# Patient Record
Sex: MALE | Race: BLACK OR AFRICAN AMERICAN | URBAN - METROPOLITAN AREA
[De-identification: names, ages, dates, MRNs, and addresses within clinical notes are randomized per-mention and may not be internally consistent; named-entity substitution may affect disease eponyms.]

---

## 2022-10-05 ENCOUNTER — OFFICE VISIT (OUTPATIENT)
Dept: FAMILY MEDICINE CLINIC | Age: 47
End: 2022-10-05
Payer: COMMERCIAL

## 2022-10-05 VITALS
HEIGHT: 74 IN | HEART RATE: 72 BPM | DIASTOLIC BLOOD PRESSURE: 84 MMHG | SYSTOLIC BLOOD PRESSURE: 110 MMHG | WEIGHT: 213.4 LBS | OXYGEN SATURATION: 98 % | BODY MASS INDEX: 27.39 KG/M2

## 2022-10-05 DIAGNOSIS — M25.461 PAIN AND SWELLING OF RIGHT KNEE: Primary | ICD-10-CM

## 2022-10-05 DIAGNOSIS — J02.9 SORE THROAT: ICD-10-CM
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
## 2022-10-05 DIAGNOSIS — M25.561 PAIN AND SWELLING OF RIGHT KNEE: Primary | ICD-10-CM

## 2022-10-05 DIAGNOSIS — R10.31 RIGHT LOWER QUADRANT PAIN: ICD-10-CM

## 2022-10-05 DIAGNOSIS — Z23 FLU VACCINE NEED: ICD-10-CM

## 2022-10-05 PROCEDURE — 99205 OFFICE O/P NEW HI 60 MIN: CPT | Performed by: FAMILY MEDICINE

## 2022-10-05 PROCEDURE — G8427 DOCREV CUR MEDS BY ELIG CLIN: HCPCS | Performed by: FAMILY MEDICINE

## 2022-10-05 PROCEDURE — 90471 IMMUNIZATION ADMIN: CPT | Performed by: FAMILY MEDICINE

## 2022-10-05 PROCEDURE — G8419 CALC BMI OUT NRM PARAM NOF/U: HCPCS | Performed by: FAMILY MEDICINE

## 2022-10-05 PROCEDURE — G8482 FLU IMMUNIZE ORDER/ADMIN: HCPCS | Performed by: FAMILY MEDICINE

## 2022-10-05 PROCEDURE — 4004F PT TOBACCO SCREEN RCVD TLK: CPT | Performed by: FAMILY MEDICINE

## 2022-10-05 PROCEDURE — 90674 CCIIV4 VAC NO PRSV 0.5 ML IM: CPT | Performed by: FAMILY MEDICINE

## 2022-10-05 RX ORDER — NAPROXEN 500 MG/1
500 TABLET ORAL 2 TIMES DAILY WITH MEALS
Qty: 60 TABLET | Refills: 0 | Status: SHIPPED | OUTPATIENT
Start: 2022-10-05 | End: 2022-10-26 | Stop reason: SDUPTHER

## 2022-10-05 RX ORDER — FAMOTIDINE 40 MG/1
40 TABLET, FILM COATED ORAL 2 TIMES DAILY
Qty: 60 TABLET | Refills: 0 | Status: SHIPPED | OUTPATIENT
Start: 2022-10-05 | End: 2022-10-26 | Stop reason: SDUPTHER

## 2022-10-05 ASSESSMENT — PATIENT HEALTH QUESTIONNAIRE - PHQ9
SUM OF ALL RESPONSES TO PHQ QUESTIONS 1-9: 2
2. FEELING DOWN, DEPRESSED OR HOPELESS: 2
SUM OF ALL RESPONSES TO PHQ QUESTIONS 1-9: 2
SUM OF ALL RESPONSES TO PHQ QUESTIONS 1-9: 2
SUM OF ALL RESPONSES TO PHQ9 QUESTIONS 1 & 2: 2
SUM OF ALL RESPONSES TO PHQ QUESTIONS 1-9: 2
1. LITTLE INTEREST OR PLEASURE IN DOING THINGS: 0

## 2022-10-05 NOTE — PROGRESS NOTES
Patient ID: Flynn Lombardi 1975    . Chief Complaint   Patient presents with    Pharyngitis     Intermittent sore throat  been going on for awhile covid test  not taken     Knee Pain     Right knee with cold weather causes pain states he did hit the knee before  pain going on for awhile  hit the knee last year          Pharyngitis    Knee Pain       Right knee pain: Ongoing for 2 years. Had 2 injuries to the right knee. The first injury was 2 years ago when he was carrying a very heavy load. He fell on his knee and sustained an injury. The next injury was 1 year ago when he was running up the steps. He was taking more than 1 step at a time. His knee hit the step with a significant force. He hit the knee on concrete and it became swollen. The pain was immediate. The next day it was better but since then he has had a swollen knee on and off. He has been trying ibuprofen and Tylenol for the knee pain. He saw a doctor in [facility] for the knee pain. When the knee is cold it is worse. He has tried wrapping the knee and putting Vicks ointment on his knee which helped. Sore throat: Ongoing for over a year. He has tried gargling with salt water and baking soda. Denies anything that makes it better or worse. Sleeping and foods do not make the sore throat pain worse. He is from [facility] and he admits to eating spicy foods. His cousin who is also here for his appointment says that he eats spicy foods and he has heartburn. Denies consuming a lot of pop or alcohol. History generated with assistance of . Orlin Castro. [id number]    Right abdominal pain: Ongoing since childhood. He attributes it to lots of gas buildup. However he also has had a hernia. Over the years they have reduced it multiple times. It goes into the groin and into the scrotum. There is no problem list on file for this patient. No past surgical history on file.     Family History   Problem Relation Age of Onset Osteoarthritis Mother        No current outpatient medications on file prior to visit. No current facility-administered medications on file prior to visit. Objective:         Physical Exam  Constitutional:       General: He is not in acute distress. Appearance: He is well-developed. HENT:      Head: Normocephalic and atraumatic. Right Ear: Hearing, tympanic membrane and external ear normal.      Left Ear: Hearing, tympanic membrane and external ear normal.      Nose: No mucosal edema or rhinorrhea. Mouth/Throat:      Pharynx: Posterior oropharyngeal erythema present. No oropharyngeal exudate. Tonsils: No tonsillar abscesses. Eyes:      General: Lids are normal.      Conjunctiva/sclera: Conjunctivae normal.   Neck:      Thyroid: No thyromegaly. Trachea: No tracheal deviation. Cardiovascular:      Rate and Rhythm: Normal rate and regular rhythm. Heart sounds: Normal heart sounds, S1 normal and S2 normal. No murmur heard. No gallop. Pulmonary:      Effort: Pulmonary effort is normal. No respiratory distress. Breath sounds: Normal breath sounds. No wheezing or rales. Abdominal:      Palpations: Abdomen is soft. There is no mass. Tenderness: There is abdominal tenderness in the right lower quadrant. Musculoskeletal:      Right knee: Swelling (diffusely swollen) present. No erythema, ecchymosis or lacerations. Decreased range of motion. Tenderness present. Right lower leg: No edema. Left lower leg: No edema. Skin:     General: Skin is warm and dry. Neurological:      Mental Status: He is oriented to person, place, and time. Psychiatric:         Speech: Speech normal.         Behavior: Behavior normal.         Thought Content:  Thought content normal.     Vitals:    10/05/22 1011   BP: 110/84   Site: Left Upper Arm   Position: Sitting   Cuff Size: Medium Adult   Pulse: 72   SpO2: 98%   Weight: 213 lb 6.4 oz (96.8 kg)   Height: 6' 1.5\" (1.867 m)     Body mass index is 27.77 kg/m². Wt Readings from Last 3 Encounters:   10/05/22 213 lb 6.4 oz (96.8 kg)     BP Readings from Last 3 Encounters:   10/05/22 110/84          No results found for this visit on 10/05/22. The ASCVD Risk score (Zbigniew SANTOS, et al., 2019) failed to calculate for the following reasons:    Cannot find a previous HDL lab    Cannot find a previous total cholesterol lab    The smoking status is invalid  Lab Review not applicable        Assessment:       Diagnosis Orders   1. Pain and swelling of right knee  MRI KNEE RIGHT [address] [place] [address], [facility], Orthopedic Surgery, [place]    naproxen (NAPROSYN) 500 MG tablet      2. Fall on stairs, subsequent encounter  MRI KNEE RIGHT [address] [place] [address], [facility], Orthopedic Surgery, [place]      3. Sore throat  famotidine (PEPCID) 40 MG tablet      4. Right lower quadrant pain  Chris Cortez MD, General Surgery, [facility]      5. Flu vaccine need  Influenza, FLUCELVAX, (age 10 mo+), IM, PF, 0.5 mL              Plan:      Recommended he apply ice to his knee but he says that makes the knee pain worse. Therefore will hold off on any further recommendations for pain. Will have him see orthopedic surgeon. And get MRI. Naproxen but warned him that the naproxen could make his sore throat symptoms worse. He wants the naproxen. The sore throat could be related to acid reflux/GERD. Patient is patient admits to consuming spicy foods. Of asked him to cut back on the spicy foods. We will start Pepcid and recheck in 3 weeks    Time: 1 hour    Extra time taken due to use of . Return in about 3 weeks (around 10/26/2022) for sore throat need translater 30minutes.

## 2022-10-26 ENCOUNTER — OFFICE VISIT (OUTPATIENT)
Dept: FAMILY MEDICINE CLINIC | Age: 47
End: 2022-10-26
Payer: COMMERCIAL

## 2022-10-26 VITALS
HEIGHT: 74 IN | BODY MASS INDEX: 27.72 KG/M2 | WEIGHT: 216 LBS | TEMPERATURE: 98 F | DIASTOLIC BLOOD PRESSURE: 70 MMHG | HEART RATE: 79 BPM | SYSTOLIC BLOOD PRESSURE: 110 MMHG

## 2022-10-26 DIAGNOSIS — M25.461 PAIN AND SWELLING OF RIGHT KNEE: ICD-10-CM

## 2022-10-26 DIAGNOSIS — M25.561 PAIN AND SWELLING OF RIGHT KNEE: ICD-10-CM

## 2022-10-26 DIAGNOSIS — Z78.9 NON-ENGLISH SPEAKING PATIENT: ICD-10-CM

## 2022-10-26 DIAGNOSIS — Z65.9 POOR SOCIAL SITUATION: ICD-10-CM

## 2022-10-26 DIAGNOSIS — J02.9 SORE THROAT: Primary | ICD-10-CM

## 2022-10-26 PROCEDURE — G8419 CALC BMI OUT NRM PARAM NOF/U: HCPCS | Performed by: FAMILY MEDICINE

## 2022-10-26 PROCEDURE — 4004F PT TOBACCO SCREEN RCVD TLK: CPT | Performed by: FAMILY MEDICINE

## 2022-10-26 PROCEDURE — G8427 DOCREV CUR MEDS BY ELIG CLIN: HCPCS | Performed by: FAMILY MEDICINE

## 2022-10-26 PROCEDURE — G8482 FLU IMMUNIZE ORDER/ADMIN: HCPCS | Performed by: FAMILY MEDICINE

## 2022-10-26 PROCEDURE — 99213 OFFICE O/P EST LOW 20 MIN: CPT | Performed by: FAMILY MEDICINE

## 2022-10-26 RX ORDER — FAMOTIDINE 40 MG/1
40 TABLET, FILM COATED ORAL 2 TIMES DAILY
Qty: 60 TABLET | Refills: 0 | Status: SHIPPED | OUTPATIENT
Start: 2022-10-26

## 2022-10-26 RX ORDER — NAPROXEN 500 MG/1
500 TABLET ORAL 2 TIMES DAILY WITH MEALS
Qty: 60 TABLET | Refills: 0 | Status: SHIPPED | OUTPATIENT
Start: 2022-10-26

## 2022-10-26 NOTE — PROGRESS NOTES
Patient ID: Albert Ascencio 1975    . Chief Complaint   Patient presents with    Pharyngitis     Lupie Flushing [id number]     HPI    Sore throat/GERD follow-up: He went to the pharmacy but his prescription was not there. Right knee pain: Had to reschedule his orthopedic surgeon appointment. He has an upcoming appointment on November 1. He needs for the MRI to be rescheduled. There is no problem list on file for this patient. No past surgical history on file. Family History   Problem Relation Age of Onset    Osteoarthritis Mother        No current outpatient medications on file prior to visit. No current facility-administered medications on file prior to visit. Objective:         Physical Exam  Constitutional:       Appearance: He is well-developed. Psychiatric:         Mood and Affect: Mood normal.         Behavior: Behavior normal.     Vitals:    10/26/22 1041   BP: 110/70   Site: Left Upper Arm   Position: Sitting   Cuff Size: Large Adult   Pulse: 79   Temp: 98 °F (36.7 °C)   Weight: 216 lb (98 kg)   Height: 6' 1.5\" (1.867 m)     Body mass index is 28.11 kg/m². Wt Readings from Last 3 Encounters:   10/26/22 216 lb (98 kg)   10/05/22 213 lb 6.4 oz (96.8 kg)     BP Readings from Last 3 Encounters:   10/26/22 110/70   10/05/22 110/84          No results found for this visit on 10/26/22. The ASCVD Risk score (Zbigniew SANTOS, et al., 2019) failed to calculate for the following reasons:    Cannot find a previous HDL lab    Cannot find a previous total cholesterol lab    The smoking status is invalid  Lab Review   No results found for any previous visit. Assessment:       Diagnosis Orders   1. Sore throat  famotidine (PEPCID) 40 MG tablet      2. Pain and swelling of right knee  naproxen (NAPROSYN) 500 MG tablet      3. Non-English speaking patient        4. Poor social situation                Plan:      Had staff check for the pharmacy.   Apparently patient's insurance is not accepted at the [facility] he wanted us to use. Prescription switched to [place]. Visit and documentation time 15 minutes due to non-English-speaking patient.  used. We will see him back in 1 month after he has been using his Pepcid. Return in about 1 month (around 11/26/2022) for [place].

## 2022-10-28 ENCOUNTER — HOSPITAL ENCOUNTER (OUTPATIENT)
Dept: GENERAL RADIOLOGY | Age: 47
Discharge: HOME | End: 2022-10-28
Payer: COMMERCIAL

## 2022-10-28 ENCOUNTER — HOSPITAL ENCOUNTER (OUTPATIENT)
Age: 47
Discharge: HOME | End: 2022-10-28
Payer: COMMERCIAL

## 2022-10-28 DIAGNOSIS — M25.561 RIGHT KNEE PAIN, UNSPECIFIED CHRONICITY: ICD-10-CM

## 2022-10-28 PROCEDURE — 73562 X-RAY EXAM OF KNEE 3: CPT

## 2022-11-01 ENCOUNTER — OFFICE VISIT (OUTPATIENT)
Dept: ORTHOPEDIC SURGERY | Age: 47
End: 2022-11-01
Payer: COMMERCIAL

## 2022-11-01 VITALS
HEIGHT: 74 IN | WEIGHT: 216.2 LBS | HEART RATE: 79 BPM | RESPIRATION RATE: 16 BRPM | BODY MASS INDEX: 27.75 KG/M2 | DIASTOLIC BLOOD PRESSURE: 82 MMHG | SYSTOLIC BLOOD PRESSURE: 115 MMHG

## 2022-11-01 DIAGNOSIS — M17.11 ARTHRITIS OF RIGHT KNEE: Primary | ICD-10-CM

## 2022-11-01 PROCEDURE — G8419 CALC BMI OUT NRM PARAM NOF/U: HCPCS

## 2022-11-01 PROCEDURE — 4004F PT TOBACCO SCREEN RCVD TLK: CPT

## 2022-11-01 PROCEDURE — 99203 OFFICE O/P NEW LOW 30 MIN: CPT

## 2022-11-01 PROCEDURE — G8482 FLU IMMUNIZE ORDER/ADMIN: HCPCS

## 2022-11-01 PROCEDURE — 20610 DRAIN/INJ JOINT/BURSA W/O US: CPT

## 2022-11-01 PROCEDURE — G8427 DOCREV CUR MEDS BY ELIG CLIN: HCPCS

## 2022-11-01 ASSESSMENT — ENCOUNTER SYMPTOMS
FACIAL SWELLING: 0
COUGH: 0
BACK PAIN: 0
RHINORRHEA: 0
NAUSEA: 0
SHORTNESS OF BREATH: 0

## 2022-11-01 NOTE — PROGRESS NOTES
Olga Moore is a 49 y.o. male presenting to the office complaining of pain in the right knee. Pt has hx of chronic knee pain      Impression   1. No acute abnormality involving the right knee.

## 2022-11-01 NOTE — PROGRESS NOTES
11/1/2022   Chief Complaint   Patient presents with    Knee Pain     right        History of Present Illness:                             Kamilah Ayers is a 49 y.o. male presenting to the office today as a new patient with right knee pain that is chronic in nature. Patient is of [place] descent and utilized  service for the entirety of his visit. Patient states that he fell while living in [facility] many years ago and impacted his right knee. He states he did not fracture anything according to the caregivers and 79 but he was placed in a cast anyway. He states that after the cast was removed he continued to deal with some pain and stiffness. He states this continues to this day but that he continues to be able to perform most activities and work through it. He states the most pain he feels is with twisting motions of his right leg. Medical History  Patient's medications, allergies, past medical, surgical, social and family histories were reviewed and updated as appropriate. No past medical history on file. No past surgical history on file. Family History   Problem Relation Age of Onset    Osteoarthritis Mother      Social History     Socioeconomic History    Marital status: Single     Current Outpatient Medications   Medication Sig Dispense Refill    famotidine (PEPCID) 40 MG tablet Take 1 tablet by mouth 2 times daily 60 tablet 0    naproxen (NAPROSYN) 500 MG tablet Take 1 tablet by mouth 2 times daily (with meals) 60 tablet 0     No current facility-administered medications for this visit. No Known Allergies      Review of Systems   Constitutional:  Negative for fever. HENT:  Negative for facial swelling and rhinorrhea. Respiratory:  Negative for cough and shortness of breath. Cardiovascular:  Negative for chest pain. Gastrointestinal:  Negative for nausea. Musculoskeletal:  Positive for arthralgias.  Negative for back pain, gait problem, joint swelling, myalgias, neck pain and neck stiffness. Skin:  Negative for pallor and rash. Neurological:  Negative for facial asymmetry and speech difficulty. Psychiatric/Behavioral:  Negative for agitation and confusion. Examination:  General Exam:  Vitals: /82   Pulse 79   Resp 16   Ht 6' 1.5\" (1.867 m)   Wt 216 lb 3.2 oz (98.1 kg)   BMI 28.14 kg/m²    Physical Exam  Constitutional:       General: He is not in acute distress. Appearance: Normal appearance. He is normal weight. He is not ill-appearing. HENT:      Head: Normocephalic and atraumatic. Nose: No rhinorrhea. Eyes:      General: No scleral icterus. Right eye: No discharge. Left eye: No discharge. Cardiovascular:      Pulses: Normal pulses. Pulmonary:      Effort: Pulmonary effort is normal. No respiratory distress. Breath sounds: No stridor. Musculoskeletal:      Comments: Right Lower Extremity:    There is mild diffuse tenderness to palpation throughout the knee maximally along the medial joint line. There is mild global swelling anteriorly at the knee with no obvious effusion. There is 5 out of 5 strength with knee flexion and extension. Sensation is intact throughout the lower extremity. There is full range of motion at the knee with discomfort at the extremes of motion, especially terminal flexion. No instability with varus or valgus stress testing or anterior/posterior drawer testing. Medial Steffanie's test produces mild pain but no palpable click. Skin is intact. Normal knee alignment. Pulses intact. Skin:     General: Skin is warm. Coloration: Skin is not jaundiced or pale. Neurological:      General: No focal deficit present. Mental Status: He is alert and oriented to person, place, and time.    Psychiatric:         Mood and Affect: Mood normal.         Behavior: Behavior normal.      Diagnostic testing:  X-ray images were reviewed by myself and discussed with the patient:    Imaging results from 10/29/2022:  FINDINGS:   The bone mineralization is within normal limits. There is mild joint space   narrowing involving the medial femorotibial compartment. No acute fractures   or dislocations are seen. No bony erosions are noted. Office Procedures:  Orders Placed This Encounter   Procedures    NY ARTHROCENTESIS ASPIR&/INJ MAJOR JT/BURSA W/O US   Procedure Note, Right Knee Intra-articular Injection:     The right knee was prepped with alcohol. A 22 gauge needle was inserted into the knee joint. The knee was then injected with 80 mg of Kenalog and 4 mL of 1% plain lidocaine. A sterile Band-Aid was applied. The patient tolerated the procedure well with no complications. Continue home exercise program and weight loss. Follow-up in 6 weeks for check of progress     Assessment and Plan  1. Mild osteoarthritis of the right knee    -I explained to the patient that he has some mild arthritic changes of medial compartment of his right knee but that it appeared he had no major bony abnormalities that would be cause for more concern. I advised him that he would likely benefit from a cortisone injection and some knee exercises. I also explained to him that there is the possibility there is something else going on with his knee such as an ACL tear/strain or meniscus tear.   If the cortisone injection does not provide him with adequate relief, then my plan is to investigate the soft tissue structures of the knee with an MRI study.  -Patient can also utilize conservative measures for pain relief and swelling reduction such as rest, ice, compression, elevation, and over-the-counter pain medication.  -Patient provided with cortisone injection today and he tolerated the procedure without complication.  -Patient scheduled for follow-up in 6 weeks to discuss the efficacy of the cortisone injection and discussed the steps forward for continued knee pain management.     Electronically signed by Jose Carlos Casas PA-C on 11/1/2022 at 2:39 PM

## 2022-11-25 ENCOUNTER — OFFICE VISIT (OUTPATIENT)
Dept: FAMILY MEDICINE CLINIC | Age: 47
End: 2022-11-25
Payer: COMMERCIAL

## 2022-11-25 VITALS
HEART RATE: 77 BPM | SYSTOLIC BLOOD PRESSURE: 100 MMHG | HEIGHT: 74 IN | WEIGHT: 215.2 LBS | DIASTOLIC BLOOD PRESSURE: 70 MMHG | BODY MASS INDEX: 27.62 KG/M2 | OXYGEN SATURATION: 97 %

## 2022-11-25 DIAGNOSIS — M25.461 PAIN AND SWELLING OF RIGHT KNEE: ICD-10-CM

## 2022-11-25 DIAGNOSIS — Z78.9 NON-ENGLISH SPEAKING PATIENT: ICD-10-CM

## 2022-11-25 DIAGNOSIS — M25.561 PAIN AND SWELLING OF RIGHT KNEE: ICD-10-CM

## 2022-11-25 DIAGNOSIS — R14.0 GASSINESS: ICD-10-CM

## 2022-11-25 DIAGNOSIS — K21.9 GASTROESOPHAGEAL REFLUX DISEASE WITHOUT ESOPHAGITIS: Primary | ICD-10-CM

## 2022-11-25 PROCEDURE — 4004F PT TOBACCO SCREEN RCVD TLK: CPT | Performed by: FAMILY MEDICINE

## 2022-11-25 PROCEDURE — G8419 CALC BMI OUT NRM PARAM NOF/U: HCPCS | Performed by: FAMILY MEDICINE

## 2022-11-25 PROCEDURE — 99214 OFFICE O/P EST MOD 30 MIN: CPT | Performed by: FAMILY MEDICINE

## 2022-11-25 PROCEDURE — G8482 FLU IMMUNIZE ORDER/ADMIN: HCPCS | Performed by: FAMILY MEDICINE

## 2022-11-25 PROCEDURE — G8427 DOCREV CUR MEDS BY ELIG CLIN: HCPCS | Performed by: FAMILY MEDICINE

## 2022-11-25 RX ORDER — OMEPRAZOLE 40 MG/1
40 CAPSULE, DELAYED RELEASE ORAL
Qty: 30 CAPSULE | Refills: 1 | Status: SHIPPED | OUTPATIENT
Start: 2022-11-25

## 2022-11-25 NOTE — Clinical Note
Please check on surgery referral.  Was referred at the last visit.   I had asked for appt to be set up before leaving today

## 2022-11-25 NOTE — PATIENT INSTRUCTIONS
BRING YOUR MEDICATION BOTTLES TO ALL APPOINTMENTS    Check MY CHART for test results. If you have forgotten your password, call [phone]. The diagnoses and medications listed in this after visit summary may not be up to date. Check MY CHART in 28-48 hours forcorrections. Late cancellation policy: So that we can better accommodate people who are sick, please give our office 24 hour notice for an appointment cancellation. Missed appointments: Your care is very important to us. It is important that you keep your scheduled appointments. Multiple missed appointments will lead to a dismissal from the office. Patients arriving late will be worked into the schedule as time permits, with patients arriving on time taken as scheduled. Late arriving patients are more than welcome to wait or reschedule their appointments. Please allow 5-7 business days for paperwork to be processed.

## 2022-11-25 NOTE — PROGRESS NOTES
Patient ID: Georgana Schwab 1975    Chief Complaint   Patient presents with    Gastroesophageal Reflux     Had issues with it last week noticed more after eating . Stopped taking Pepcid due to still having acid reflex   When acid and gas comes he drinks milk and it hurts   He stopped taking Naproxen      [place] Gastelum [id number]        Gastroesophageal Reflux    Sore throat/ GERD: Ongoing for over a year. He has tried gargling with salt water and baking soda. Denies anything that makes it better or worse. Sleeping and foods do not make the sore throat pain worse. He is from [place] and he admits to eating spicy foods. He has not changed his diet. He stopped taking the Pepcid that was previously prescribed . Drinking milk helps. He thinks the pain is related to his hernia (did not go to appointment). Lots of gas in the stomach    Right knee pain: worse when he is cold. Could not afford MRI. Got knee injection done by [facility]. He has f/u appt with them soon      There is no problem list on file for this patient. No past surgical history on file. Family History   Problem Relation Age of Onset    Osteoarthritis Mother        Current Outpatient Medications on File Prior to Visit   Medication Sig Dispense Refill    famotidine (PEPCID) 40 MG tablet Take 1 tablet by mouth 2 times daily (Patient not taking: Reported on 11/25/2022) 60 tablet 0    naproxen (NAPROSYN) 500 MG tablet Take 1 tablet by mouth 2 times daily (with meals) (Patient not taking: Reported on 11/25/2022) 60 tablet 0     No current facility-administered medications on file prior to visit. Objective:           Physical Exam  Vitals and nursing note reviewed. HENT:      Head: Normocephalic and atraumatic. Cardiovascular:      Rate and Rhythm: Normal rate and regular rhythm.       Heart sounds: Normal heart sounds, S1 normal and S2 normal.   Pulmonary:      Effort: Pulmonary effort is normal.      Breath sounds: Normal breath sounds. Abdominal:      General: Bowel sounds are normal.      Palpations: Abdomen is soft. There is no mass. Tenderness: There is no abdominal tenderness. Skin:     General: Skin is warm and dry. Neurological:      Mental Status: He is alert. Psychiatric:         Mood and Affect: Affect is angry. Speech: Speech normal.         Behavior: Behavior normal.     Vitals:    11/25/22 1021   BP: 100/70   Site: Left Upper Arm   Position: Sitting   Cuff Size: Large Adult   Pulse: 77   SpO2: 97%   Weight: 215 lb 3.2 oz (97.6 kg)   Height: 6' 1.5\" (1.867 m)     Body mass index is 28.01 kg/m². Wt Readings from Last 3 Encounters:   11/25/22 215 lb 3.2 oz (97.6 kg)   11/01/22 216 lb 3.2 oz (98.1 kg)   10/26/22 216 lb (98 kg)     BP Readings from Last 3 Encounters:   11/25/22 100/70   11/01/22 115/82   10/26/22 110/70          No results found for this visit on 11/25/22. Assessment:       Diagnosis Orders   1. Gastroesophageal reflux disease without esophagitis  omeprazole (PRILOSEC) 40 MG delayed release capsule      2. Non-English speaking patient        3. Pain and swelling of right knee        4. Gassiness                Plan:      Switch from Pepcid to Prilosec. Can f/u ortho regarding the knee. I cannot explain why cold makes his knee worse. Info in [facility] given to patient regarding gassiness. Will have staff call for him to see surgeon. Time 30 minutes    Return in about 2 months (around 1/25/2023) for [place].

## 2022-11-28 ENCOUNTER — TELEPHONE (OUTPATIENT)
Dept: FAMILY MEDICINE CLINIC | Age: 47
End: 2022-11-28

## 2022-11-28 NOTE — TELEPHONE ENCOUNTER
----- Message from Carlito Holcomb MD sent at 11/25/2022  2:57 PM EST -----  Please check on surgery referral.  Was referred at the last visit.   I had asked for appt to be set up before leaving today

## 2022-11-28 NOTE — TELEPHONE ENCOUNTER
I called over to Dr. Osorio Devoid office today they were closed on Friday, Kathy Workman stated that the patient was scheduled to come into the office on 10/13/2022 and he no showed to his appointment. She also stated that they tried calling the patient to have him rescheduled his appointment and no VM is set up.

## 2024-02-07 ENCOUNTER — APPOINTMENT (OUTPATIENT)
Dept: GENERAL RADIOLOGY | Age: 49
End: 2024-02-07
Payer: COMMERCIAL

## 2024-02-07 ENCOUNTER — HOSPITAL ENCOUNTER (EMERGENCY)
Age: 49
Discharge: HOME | End: 2024-02-07
Attending: STUDENT IN AN ORGANIZED HEALTH CARE EDUCATION/TRAINING PROGRAM
Payer: COMMERCIAL

## 2024-02-07 ENCOUNTER — APPOINTMENT (OUTPATIENT)
Dept: CT IMAGING | Age: 49
End: 2024-02-07
Payer: COMMERCIAL

## 2024-02-07 VITALS
DIASTOLIC BLOOD PRESSURE: 88 MMHG | TEMPERATURE: 98.5 F | RESPIRATION RATE: 18 BRPM | OXYGEN SATURATION: 98 % | SYSTOLIC BLOOD PRESSURE: 134 MMHG | HEART RATE: 71 BPM

## 2024-02-07 DIAGNOSIS — R79.89 ELEVATED LFTS: ICD-10-CM

## 2024-02-07 DIAGNOSIS — R73.9 HYPERGLYCEMIA: ICD-10-CM

## 2024-02-07 DIAGNOSIS — K76.0 HEPATIC STEATOSIS: ICD-10-CM

## 2024-02-07 DIAGNOSIS — K29.00 ACUTE GASTRITIS, PRESENCE OF BLEEDING UNSPECIFIED, UNSPECIFIED GASTRITIS TYPE: Primary | ICD-10-CM

## 2024-02-07 DIAGNOSIS — R10.13 ABDOMINAL PAIN, EPIGASTRIC: ICD-10-CM

## 2024-02-07 LAB
ALBUMIN SERPL-MCNC: 4.9 GM/DL (ref 3.4–5)
ALP BLD-CCNC: 115 IU/L (ref 40–129)
ALT SERPL-CCNC: 97 U/L (ref 10–40)
ANION GAP SERPL CALCULATED.3IONS-SCNC: 15 MMOL/L (ref 7–16)
AST SERPL-CCNC: 40 IU/L (ref 15–37)
BASOPHILS ABSOLUTE: 0 K/CU MM
BASOPHILS RELATIVE PERCENT: 0.4 % (ref 0–1)
BILIRUB SERPL-MCNC: 0.3 MG/DL (ref 0–1)
BUN SERPL-MCNC: 14 MG/DL (ref 6–23)
CALCIUM SERPL-MCNC: 9.5 MG/DL (ref 8.3–10.6)
CHLORIDE BLD-SCNC: 97 MMOL/L (ref 99–110)
CO2: 20 MMOL/L (ref 21–32)
CREAT SERPL-MCNC: 1.1 MG/DL (ref 0.9–1.3)
D DIMER: 0.43 UG/ML (FEU)
DIFFERENTIAL TYPE: ABNORMAL
EKG ATRIAL RATE: 112 BPM
EKG DIAGNOSIS: NORMAL
EKG P AXIS: 51 DEGREES
EKG P-R INTERVAL: 122 MS
EKG Q-T INTERVAL: 324 MS
EKG QRS DURATION: 86 MS
EKG QTC CALCULATION (BAZETT): 442 MS
EKG R AXIS: -23 DEGREES
EKG T AXIS: 57 DEGREES
EKG VENTRICULAR RATE: 112 BPM
EOSINOPHILS ABSOLUTE: 0 K/CU MM
EOSINOPHILS RELATIVE PERCENT: 0.6 % (ref 0–3)
GFR SERPL CREATININE-BSD FRML MDRD: >60 ML/MIN/1.73M2
GLUCOSE SERPL-MCNC: 307 MG/DL (ref 70–99)
HCT VFR BLD CALC: 44.2 % (ref 42–52)
HEMOGLOBIN: 14.7 GM/DL (ref 13.5–18)
IMMATURE NEUTROPHIL %: 0.4 % (ref 0–0.43)
LIPASE: 28 IU/L (ref 13–60)
LYMPHOCYTES ABSOLUTE: 1.9 K/CU MM
LYMPHOCYTES RELATIVE PERCENT: 41.4 % (ref 24–44)
MAGNESIUM: 2.1 MG/DL (ref 1.8–2.4)
MCH RBC QN AUTO: 27.6 PG (ref 27–31)
MCHC RBC AUTO-ENTMCNC: 33.3 % (ref 32–36)
MCV RBC AUTO: 83.1 FL (ref 78–100)
MONOCYTES ABSOLUTE: 0.5 K/CU MM
MONOCYTES RELATIVE PERCENT: 10.3 % (ref 0–4)
NUCLEATED RBC %: 0 %
PDW BLD-RTO: 12.1 % (ref 11.7–14.9)
PLATELET # BLD: 399 K/CU MM (ref 140–440)
PMV BLD AUTO: 9.2 FL (ref 7.5–11.1)
POTASSIUM SERPL-SCNC: 4.2 MMOL/L (ref 3.5–5.1)
RBC # BLD: 5.32 M/CU MM (ref 4.6–6.2)
SEGMENTED NEUTROPHILS ABSOLUTE COUNT: 2.2 K/CU MM
SEGMENTED NEUTROPHILS RELATIVE PERCENT: 46.9 % (ref 36–66)
SODIUM BLD-SCNC: 132 MMOL/L (ref 135–145)
TOTAL IMMATURE NEUTOROPHIL: 0.02 K/CU MM
TOTAL NUCLEATED RBC: 0 K/CU MM
TOTAL PROTEIN: 8 GM/DL (ref 6.4–8.2)
TROPONIN, HIGH SENSITIVITY: 8 NG/L (ref 0–22)
TROPONIN, HIGH SENSITIVITY: <6 NG/L (ref 0–22)
WBC # BLD: 4.7 K/CU MM (ref 4–10.5)

## 2024-02-07 PROCEDURE — 85379 FIBRIN DEGRADATION QUANT: CPT

## 2024-02-07 PROCEDURE — 6370000000 HC RX 637 (ALT 250 FOR IP)

## 2024-02-07 PROCEDURE — 71045 X-RAY EXAM CHEST 1 VIEW: CPT

## 2024-02-07 PROCEDURE — 74177 CT ABD & PELVIS W/CONTRAST: CPT

## 2024-02-07 PROCEDURE — 83690 ASSAY OF LIPASE: CPT

## 2024-02-07 PROCEDURE — 93005 ELECTROCARDIOGRAM TRACING: CPT | Performed by: STUDENT IN AN ORGANIZED HEALTH CARE EDUCATION/TRAINING PROGRAM

## 2024-02-07 PROCEDURE — 85025 COMPLETE CBC W/AUTO DIFF WBC: CPT

## 2024-02-07 PROCEDURE — 6360000004 HC RX CONTRAST MEDICATION

## 2024-02-07 PROCEDURE — 84484 ASSAY OF TROPONIN QUANT: CPT

## 2024-02-07 PROCEDURE — 93010 ELECTROCARDIOGRAM REPORT: CPT | Performed by: INTERNAL MEDICINE

## 2024-02-07 PROCEDURE — 99285 EMERGENCY DEPT VISIT HI MDM: CPT

## 2024-02-07 PROCEDURE — 80053 COMPREHEN METABOLIC PANEL: CPT

## 2024-02-07 PROCEDURE — 83735 ASSAY OF MAGNESIUM: CPT

## 2024-02-07 RX ORDER — ASPIRIN 81 MG/1
324 TABLET, CHEWABLE ORAL ONCE
Status: COMPLETED | OUTPATIENT
Start: 2024-02-07 | End: 2024-02-07

## 2024-02-07 RX ORDER — MAGNESIUM HYDROXIDE/ALUMINUM HYDROXICE/SIMETHICONE 120; 1200; 1200 MG/30ML; MG/30ML; MG/30ML
30 SUSPENSION ORAL ONCE
Status: COMPLETED | OUTPATIENT
Start: 2024-02-07 | End: 2024-02-07

## 2024-02-07 RX ORDER — FAMOTIDINE 20 MG/1
20 TABLET, FILM COATED ORAL 2 TIMES DAILY
Qty: 60 TABLET | Refills: 3 | Status: SHIPPED | OUTPATIENT
Start: 2024-02-07

## 2024-02-07 RX ADMIN — IOPAMIDOL 75 ML: 755 INJECTION, SOLUTION INTRAVENOUS at 15:15

## 2024-02-07 RX ADMIN — Medication 30 ML: at 12:54

## 2024-02-07 RX ADMIN — ASPIRIN 81 MG 324 MG: 81 TABLET ORAL at 12:54

## 2024-02-07 ASSESSMENT — HEART SCORE: ECG: 0

## 2024-02-07 NOTE — DISCHARGE INSTRUCTIONS
You can use Tylenol as needed for pain  You can use Pepcid to help with your pain.  Try to avoid alcohol, spicy foods, or acidic foods or fruits.  Call and follow-up with your family doctor in the next 3-5 days  Return to the ED if your symptoms worsen or you feel you need to be reevaluated    You can use the resources below to find a new family doctor if needed:                                                  Primary Care Physicians    [facility]    Dr. Duc Garcia MD  [facility]  [address]  [place], [place] [address]  [phone]    Radha Bhanadri CNP  [facility]  [address]  [place], [place] [address]  [phone]    [facility]    Mila Garcia MD  [facility] [address]  [place], [place] [address]  [phone]      [facility].     Gabriel Vázquez MD  [address]  [place], [place] [address]  [phone]    Lulu Haney [facility]  [address] [place] [address]  [phone]    Jacqueline Ronquillo [facility]   [address] [place] [address]  [phone]    Cristina Toledo MD  [address]  [place], [place] [address]  [phone]     Erik Fernandes MD  [address]  [place], [place] [address]  [phone]    Clari James PA-C  [address]  [place], [place] [address]  [phone]    Primary Care Providers [place]    Dr. Neeraj Lagos MD  [address] [place] [address]  [phone]    Dr. Brayan Smith MD   [address] [place] [address]  [phone]    Primary Care Providers Chiquita Copeland MD  [address] [place], [place] [address] [phone]       [facility]    Shana Ibrahim MD  [address] [address]  [phone]    Daquan Pavon CNP  [facility]  [address] [address]  [phone]       Internal Med    Jacqueline Pedraza NP  [address] [id number]  [phone]        [facility]    [place]

## 2024-02-07 NOTE — ED PROVIDER NOTES
I independently examined and evaluated Iker Herzog.    In brief, 48 y.o. male with a past medical history of GERD constipation who was seen and evaluated in the emergency department for abdominal pain.  Patient says he has been having some epigastric abdominal pain.  Says he is having some bilateral breast pain behind his nipples.  Denies chest pain.  Denies nausea or vomiting.  Says he has been having some issues with his sugar being high says that he used to take medication for but is not taking any currently.        Physical Exam  Constitutional:       General: He is not in acute distress.     Appearance: Normal appearance. He is not ill-appearing, toxic-appearing or diaphoretic.   HENT:      Head: Normocephalic and atraumatic.      Right Ear: External ear normal.      Left Ear: External ear normal.   Eyes:      General: No scleral icterus.        Right eye: No discharge.         Left eye: No discharge.   Cardiovascular:      Rate and Rhythm: Normal rate and regular rhythm.      Comments: Bilateral radial pulses equal.  Pulmonary:      Effort: Pulmonary effort is normal. No respiratory distress.      Breath sounds: Normal breath sounds. No stridor. No wheezing, rhonchi or rales.   Chest:      Chest wall: No tenderness.   Abdominal:      General: Abdomen is flat. There is no distension.      Palpations: Abdomen is soft.      Tenderness: There is no abdominal tenderness. There is no guarding or rebound.   Musculoskeletal:      Cervical back: Neck supple.      Right lower leg: No edema.      Left lower leg: No edema.   Skin:     General: Skin is warm and dry.   Neurological:      Mental Status: He is alert and oriented to person, place, and time. Mental status is at baseline.   Psychiatric:         Mood and Affect: Mood normal.         Behavior: Behavior normal.         Thought Content: Thought content normal.         Judgment: Judgment normal.               MDM, CC/HPI Summary, DDx, ED Course, and

## 2024-02-07 NOTE — ED PROVIDER NOTES
[facility] EMERGENCY DEPARTMENT  EMERGENCY DEPARTMENT ENCOUNTER        Pt Name: Iker Herzog  MRN: [id number]  Birthdate 1975  Date of evaluation: 2/7/2024  Provider: TRAVIS Little - CNP  PCP: Neda Leon MD  Note Started: 12:04 PM EST 2/7/24       I have seen and evaluated this patient with my supervising physician Fred Lema MD.      CHIEF COMPLAINT       Chief Complaint   Patient presents with    Chest Pain     Right and left sided continuous cramping/sharp pain. Started a month ago.     Abdominal Pain     Hx a hernia in his abdomen.  States it doesn't stay in one spot and it moves. Denies taking anything for pain at home.        HISTORY OF PRESENT ILLNESS: 1 or more Elements     History From: Patient    Limitations to history : Language Cuban Creole    Social Determinants Significantly Affecting Health : None    Chief Complaint: Breast pain and upper abdominal pain    Iker Herzog is a 48 y.o. male history of GERD who presents to ED stating that for the past month he has had bilateral breast pain which she states is behind his nipples but deeper.  He describes it as a sharp stabbing pain.  He denies any nipple discharge.  Denies any chest pain.  States he feels as if his heart is racing.  States he has had this pain for a month but has gotten worse over the past week.  He also reports having upper abdominal pain that goes across to his abdomen.  He states it makes it feel as if he cannot eat and when he does not eat he feels lightheaded.  He denies any nausea vomiting or diarrhea.  States he is concerned that his blood sugar is high.  States he used to take blood sugar medication he does not take it anymore because he felt as if it was not working.  He also reports history of constipation.  Denies any blood in his stools or dark-colored stools.  Denies any recent fevers cough or congestion.  States his stomach pain started about a week ago.

## 2024-02-09 ENCOUNTER — TELEPHONE (OUTPATIENT)
Dept: FAMILY MEDICINE CLINIC | Age: 49
End: 2024-02-09

## 2024-02-09 ENCOUNTER — TELEPHONE (OUTPATIENT)
Dept: INTERNAL MEDICINE CLINIC | Age: 49
End: 2024-02-09

## 2024-02-09 NOTE — TELEPHONE ENCOUNTER
Patient called to schedule apt due to his glucose sugars running high.   He went to the ER on Wednesday .   Told him if his sugar continuing to run high he needs to go back to the ER patient understood.  Made apt for patient on Tuesday.

## 2024-02-13 ENCOUNTER — OFFICE VISIT (OUTPATIENT)
Dept: FAMILY MEDICINE CLINIC | Age: 49
End: 2024-02-13
Payer: COMMERCIAL

## 2024-02-13 VITALS
WEIGHT: 220 LBS | BODY MASS INDEX: 28.23 KG/M2 | DIASTOLIC BLOOD PRESSURE: 86 MMHG | OXYGEN SATURATION: 97 % | HEART RATE: 92 BPM | HEIGHT: 74 IN | SYSTOLIC BLOOD PRESSURE: 120 MMHG

## 2024-02-13 DIAGNOSIS — E11.9 NEW ONSET TYPE 2 DIABETES MELLITUS (HCC): Primary | ICD-10-CM

## 2024-02-13 DIAGNOSIS — K21.9 GASTROESOPHAGEAL REFLUX DISEASE WITHOUT ESOPHAGITIS: ICD-10-CM

## 2024-02-13 DIAGNOSIS — N64.4 BILATERAL MASTODYNIA: ICD-10-CM

## 2024-02-13 DIAGNOSIS — K76.0 HEPATIC STEATOSIS: ICD-10-CM

## 2024-02-13 LAB — HBA1C MFR BLD: 12.7 %

## 2024-02-13 PROCEDURE — 3046F HEMOGLOBIN A1C LEVEL >9.0%: CPT | Performed by: FAMILY MEDICINE

## 2024-02-13 PROCEDURE — 83036 HEMOGLOBIN GLYCOSYLATED A1C: CPT | Performed by: FAMILY MEDICINE

## 2024-02-13 PROCEDURE — 99214 OFFICE O/P EST MOD 30 MIN: CPT | Performed by: FAMILY MEDICINE

## 2024-02-13 RX ORDER — GLUCOSAMINE HCL/CHONDROITIN SU 500-400 MG
1 CAPSULE ORAL 3 TIMES DAILY
Qty: 100 STRIP | Refills: 11 | Status: SHIPPED | OUTPATIENT
Start: 2024-02-13 | End: 2024-03-14

## 2024-02-13 RX ORDER — GLIPIZIDE 10 MG/1
10 TABLET, FILM COATED, EXTENDED RELEASE ORAL DAILY
Qty: 30 TABLET | Refills: 3 | Status: SHIPPED | OUTPATIENT
Start: 2024-02-13

## 2024-02-13 RX ORDER — BLOOD SUGAR DIAGNOSTIC
STRIP MISCELLANEOUS
Qty: 100 EACH | Refills: 11 | Status: SHIPPED | OUTPATIENT
Start: 2024-02-13

## 2024-02-13 RX ORDER — NAPROXEN 500 MG/1
500 TABLET ORAL 2 TIMES DAILY WITH MEALS
Qty: 60 TABLET | Refills: 0 | Status: SHIPPED | OUTPATIENT
Start: 2024-02-13

## 2024-02-13 RX ORDER — FAMOTIDINE 20 MG/1
20 TABLET, FILM COATED ORAL 2 TIMES DAILY
Qty: 180 TABLET | Refills: 3 | Status: SHIPPED | OUTPATIENT
Start: 2024-02-13

## 2024-02-13 RX ORDER — METFORMIN HYDROCHLORIDE 500 MG/1
1000 TABLET, EXTENDED RELEASE ORAL
Qty: 60 TABLET | Refills: 5 | Status: SHIPPED | OUTPATIENT
Start: 2024-02-13

## 2024-02-13 RX ORDER — LANCETS 30 GAUGE
1 EACH MISCELLANEOUS 3 TIMES DAILY
Qty: 270 EACH | Refills: 3 | Status: SHIPPED | OUTPATIENT
Start: 2024-02-13 | End: 2024-05-13

## 2024-02-13 NOTE — PROGRESS NOTES
Patient ID: Iker Herzog 1975    .  Chief Complaint   Patient presents with         Raghu #[id number]  session [id number]    Blood Sugar Problem     Blood sugars running high     Muscle Pain     Pectoralis muscles hurt     Gastroesophageal Reflux    Follow-up    Urinary Frequency    Blurred Vision     When his sugars are high     Taste Change     Taste change in mouth          Gastroesophageal Reflux  He complains of abdominal pain. This is a recurrent problem. The current episode started more than 1 year ago. He has tried a histamine-2 antagonist for the symptoms. The treatment provided significant relief.   Diabetes  Diabetes visit type: just finding out about DM today.  has been checking his sugars on his own.  has seen sugar up to 400. He has type 2 diabetes mellitus. Associated symptoms include blurred vision and polyuria. When asked about current treatments, none were reported.     Nipple tenderness: for 2-3 months.  Does not smoke marijuana    Patient Active Problem List   Diagnosis    Hepatic steatosis       No past surgical history on file.    Family History   Problem Relation Age of Onset    Osteoarthritis Mother        Current Outpatient Medications on File Prior to Visit   Medication Sig Dispense Refill    omeprazole (PRILOSEC) 40 MG delayed release capsule Take 1 capsule by mouth every morning (before breakfast) (Patient not taking: Reported on 2/7/2024) 30 capsule 1     No current facility-administered medications on file prior to visit.                   Objective:         Physical Exam  Vitals and nursing note reviewed.   Constitutional:       Appearance: He is well-developed.   HENT:      Head: Normocephalic and atraumatic.      Mouth/Throat:      Mouth: Mucous membranes are moist.      Pharynx: Oropharynx is clear.   Cardiovascular:      Rate and Rhythm: Normal rate and regular rhythm.      Heart sounds: Normal heart sounds, S1 normal and S2 normal.   Pulmonary:      Effort: No

## 2024-02-13 NOTE — PATIENT INSTRUCTIONS
NEW OFFICE HOURS: M-TH 7AM-5PM      BRING YOUR MEDICATION BOTTLES TO ALL APPOINTMENTS    Check MY CHART for test results.  If you have forgotten your password, call [phone].    The diagnoses and medications listed in this after visit summary may not be up to date.  Check MY CHART in 28-48 hours for corrections.      Late cancellation policy: So that we can better accommodate people who are sick, please give our office 24 hour notice for an appointment cancellation.      Missed appointments: Your care is very important to us.  It is important that you keep your scheduled appointments.   Multiple missed appointments will lead to a dismissal from the office.      Patients arriving late will be worked into the schedule as time permits, with patients arriving on time taken as scheduled. Late arriving patients are more than welcome to wait or reschedule their appointments.    Please allow 5-7 business days for paperwork to be processed.

## 2024-02-19 ENCOUNTER — HOSPITAL ENCOUNTER (OUTPATIENT)
Dept: WOMENS IMAGING | Age: 49
Discharge: HOME | End: 2024-02-19
Payer: COMMERCIAL

## 2024-02-19 ENCOUNTER — HOSPITAL ENCOUNTER (OUTPATIENT)
Dept: ULTRASOUND IMAGING | Age: 49
Discharge: HOME | End: 2024-02-19
Payer: COMMERCIAL

## 2024-02-19 DIAGNOSIS — N64.4 BILATERAL MASTODYNIA: ICD-10-CM

## 2024-02-19 PROCEDURE — 77066 DX MAMMO INCL CAD BI: CPT

## 2024-02-19 PROCEDURE — 76642 ULTRASOUND BREAST LIMITED: CPT

## 2024-02-27 ENCOUNTER — OFFICE VISIT (OUTPATIENT)
Dept: FAMILY MEDICINE CLINIC | Age: 49
End: 2024-02-27
Payer: COMMERCIAL

## 2024-02-27 VITALS
WEIGHT: 226 LBS | BODY MASS INDEX: 29.41 KG/M2 | HEART RATE: 77 BPM | SYSTOLIC BLOOD PRESSURE: 138 MMHG | DIASTOLIC BLOOD PRESSURE: 88 MMHG | OXYGEN SATURATION: 98 %

## 2024-02-27 DIAGNOSIS — Z78.9 PATIENT SPEAKS ONLY A FOREIGN LANGUAGE: ICD-10-CM

## 2024-02-27 DIAGNOSIS — E11.65 TYPE 2 DIABETES MELLITUS WITH HYPERGLYCEMIA, WITHOUT LONG-TERM CURRENT USE OF INSULIN (HCC): Primary | ICD-10-CM

## 2024-02-27 PROCEDURE — 99214 OFFICE O/P EST MOD 30 MIN: CPT | Performed by: FAMILY MEDICINE

## 2024-02-27 PROCEDURE — 3046F HEMOGLOBIN A1C LEVEL >9.0%: CPT | Performed by: FAMILY MEDICINE

## 2024-02-27 RX ORDER — ATORVASTATIN CALCIUM 40 MG/1
40 TABLET, FILM COATED ORAL DAILY
Qty: 30 TABLET | Refills: 3 | Status: SHIPPED | OUTPATIENT
Start: 2024-02-27

## 2024-02-27 RX ORDER — ASPIRIN 81 MG/1
81 TABLET ORAL DAILY
Qty: 90 TABLET | Refills: 3 | Status: SHIPPED | OUTPATIENT
Start: 2024-02-27

## 2024-02-27 RX ORDER — PIOGLITAZONEHYDROCHLORIDE 30 MG/1
30 TABLET ORAL DAILY
Qty: 30 TABLET | Refills: 2 | Status: SHIPPED | OUTPATIENT
Start: 2024-02-27

## 2024-02-27 NOTE — PROGRESS NOTES
release capsule Take 1 capsule by mouth every morning (before breakfast) (Patient not taking: Reported on 2/7/2024) 30 capsule 1     No current facility-administered medications on file prior to visit.                   Objective:           Physical Exam  Constitutional:       Appearance: He is well-developed.      Comments: Speaks Indian Creole   Psychiatric:         Mood and Affect: Mood normal.         Behavior: Behavior normal.         Thought Content: Thought content normal.         Judgment: Judgment normal.       Vitals:    02/27/24 1419   BP: 138/88   Site: Left Upper Arm   Position: Sitting   Cuff Size: Large Adult   Pulse: 77   SpO2: 98%   Weight: 102.5 kg (226 lb)     Body mass index is 29.41 kg/m².     Wt Readings from Last 3 Encounters:   02/27/24 102.5 kg (226 lb)   02/13/24 99.8 kg (220 lb)   11/25/22 97.6 kg (215 lb 3.2 oz)     BP Readings from Last 3 Encounters:   02/27/24 138/88   02/13/24 120/86   02/07/24 134/88          No results found for this visit on 02/27/24.        Assessment:       Diagnosis Orders   1. Type 2 diabetes mellitus with hyperglycemia, without long-term current use of insulin (HCC)  pioglitazone (ACTOS) 30 MG tablet    aspirin 81 MG EC tablet    atorvastatin (LIPITOR) 40 MG tablet      2. Patient speaks only a foreign language                Plan:      DM improving, but sugars too high.  Add on Actos.  Also add on ASA and lipitor    Low carb diet, My plate    Time: 30 minutes    Return in about 3 months (around 5/27/2024) for DM.        Clear

## 2024-02-27 NOTE — PATIENT INSTRUCTIONS
NEW OFFICE HOURS: M-TH 7AM-5PM      BRING YOUR MEDICATION BOTTLES TO ALL APPOINTMENTS    Check MY CHART for test results.  If you have forgotten your password, call [phone].    The diagnoses and medications listed in this after visit summary may not be up to date.  Check MY CHART in 28-48 hours for corrections.      Late cancellation policy: So that we can better accommodate people who are sick, please give our office 24 hour notice for an appointment cancellation.      Missed appointments: Your care is very important to us.  It is important that you keep your scheduled appointments.   Multiple missed appointments will lead to a dismissal from the office.      Patients arriving late will be worked into the schedule as time permits, with patients arriving on time taken as scheduled. Late arriving patients are more than welcome to wait or reschedule their appointments.    Please allow 5-7 business days for paperwork to be processed.

## 2024-04-04 ENCOUNTER — TELEPHONE (OUTPATIENT)
Dept: FAMILY MEDICINE CLINIC | Age: 49
End: 2024-04-04

## 2024-04-04 NOTE — TELEPHONE ENCOUNTER
Unable to reach patient. Mailed [facility] information patient request on 2/13/24. Mailed updated AVS with [employer] on 4/4/24

## 2024-05-28 ENCOUNTER — OFFICE VISIT (OUTPATIENT)
Dept: FAMILY MEDICINE CLINIC | Age: 49
End: 2024-05-28
Payer: COMMERCIAL

## 2024-05-28 VITALS
BODY MASS INDEX: 27.98 KG/M2 | OXYGEN SATURATION: 98 % | DIASTOLIC BLOOD PRESSURE: 78 MMHG | HEART RATE: 69 BPM | SYSTOLIC BLOOD PRESSURE: 124 MMHG | WEIGHT: 215 LBS

## 2024-05-28 DIAGNOSIS — Z11.4 SCREENING FOR HIV (HUMAN IMMUNODEFICIENCY VIRUS): ICD-10-CM

## 2024-05-28 DIAGNOSIS — E11.9 TYPE 2 DIABETES MELLITUS WITHOUT COMPLICATION, WITHOUT LONG-TERM CURRENT USE OF INSULIN (HCC): Primary | ICD-10-CM

## 2024-05-28 DIAGNOSIS — K21.9 GASTROESOPHAGEAL REFLUX DISEASE WITHOUT ESOPHAGITIS: ICD-10-CM

## 2024-05-28 DIAGNOSIS — Z23 NEED FOR PNEUMOCOCCAL VACCINE: ICD-10-CM

## 2024-05-28 DIAGNOSIS — Z78.9 PATIENT SPEAKS ONLY A FOREIGN LANGUAGE: ICD-10-CM

## 2024-05-28 DIAGNOSIS — Z23 NEED FOR TETANUS BOOSTER: ICD-10-CM

## 2024-05-28 DIAGNOSIS — Z11.59 NEED FOR HEPATITIS C SCREENING TEST: ICD-10-CM

## 2024-05-28 LAB — HBA1C MFR BLD: 6.8 %

## 2024-05-28 PROCEDURE — 90677 PCV20 VACCINE IM: CPT | Performed by: FAMILY MEDICINE

## 2024-05-28 PROCEDURE — 3044F HG A1C LEVEL LT 7.0%: CPT | Performed by: FAMILY MEDICINE

## 2024-05-28 PROCEDURE — 90471 IMMUNIZATION ADMIN: CPT | Performed by: FAMILY MEDICINE

## 2024-05-28 PROCEDURE — 83036 HEMOGLOBIN GLYCOSYLATED A1C: CPT | Performed by: FAMILY MEDICINE

## 2024-05-28 PROCEDURE — 99214 OFFICE O/P EST MOD 30 MIN: CPT | Performed by: FAMILY MEDICINE

## 2024-05-28 RX ORDER — METFORMIN HYDROCHLORIDE 500 MG/1
1000 TABLET, EXTENDED RELEASE ORAL
Qty: 180 TABLET | Refills: 1 | Status: SHIPPED | OUTPATIENT
Start: 2024-05-28

## 2024-05-28 RX ORDER — PIOGLITAZONEHYDROCHLORIDE 30 MG/1
30 TABLET ORAL DAILY
Qty: 90 TABLET | Refills: 1 | Status: SHIPPED | OUTPATIENT
Start: 2024-05-28

## 2024-05-28 RX ORDER — GLIPIZIDE 10 MG/1
10 TABLET, FILM COATED, EXTENDED RELEASE ORAL DAILY
Qty: 90 TABLET | Refills: 1 | Status: SHIPPED | OUTPATIENT
Start: 2024-05-28

## 2024-05-28 RX ORDER — OMEPRAZOLE 40 MG/1
40 CAPSULE, DELAYED RELEASE ORAL
Qty: 90 CAPSULE | Refills: 3 | Status: SHIPPED | OUTPATIENT
Start: 2024-05-28

## 2024-05-28 RX ORDER — ATORVASTATIN CALCIUM 40 MG/1
40 TABLET, FILM COATED ORAL DAILY
Qty: 90 TABLET | Refills: 1 | Status: SHIPPED | OUTPATIENT
Start: 2024-05-28

## 2024-05-28 ASSESSMENT — ENCOUNTER SYMPTOMS: ABDOMINAL PAIN: 1

## 2024-05-28 NOTE — PROGRESS NOTES
Patient ID: Iker Herzog 1975    .  Chief Complaint   Patient presents with    Josef Khan # [id number]    Diabetes         Diabetes  Diabetes visit type: just finding out about DM today.  has been checking his sugars on his own.  has seen sugar up to 400. He has type 2 diabetes mellitus. When asked about current treatments, none were reported. His overall blood glucose range is  mg/dl.   Gastroesophageal Reflux  He complains of abdominal pain. This is a recurrent problem. The current episode started more than 1 year ago. The problem occurs frequently. He has tried a histamine-2 antagonist for the symptoms. The treatment provided moderate relief.         Patient Active Problem List   Diagnosis    Hepatic steatosis    Type 2 diabetes mellitus without complication, without long-term current use of insulin (HCC)    Patient speaks only a foreign language    Gastroesophageal reflux disease without esophagitis       No past surgical history on file.    Family History   Problem Relation Age of Onset    Osteoarthritis Mother        Current Outpatient Medications on File Prior to Visit   Medication Sig Dispense Refill    aspirin 81 MG EC tablet Take 1 tablet by mouth daily 90 tablet 3    Alcohol Swabs (ALCOHOL PADS) 70 % PADS Use 3 times per day 100 each 11    naproxen (NAPROSYN) 500 MG tablet Take 1 tablet by mouth 2 times daily (with meals) 60 tablet 0    Lancets MISC 1 each by Does not apply route 3 times daily for 270 doses 270 each 3    blood glucose monitor strips 1 strip by Other route 3 times daily for 90 doses Test 3 times a day & as needed for symptoms of irregular blood glucose. Dispense sufficient amount for indicated testing frequency plus additional to accommodate PRN testing needs. 100 strip 11     No current facility-administered medications on file prior to visit.                   Objective:         Physical Exam  Vitals and nursing note reviewed.   Constitutional:       Appearance: He is

## 2024-05-28 NOTE — PATIENT INSTRUCTIONS
NEW OFFICE HOURS: M-TH 7AM-5PM  BRING YOUR MEDICATION BOTTLES TO ALL APPOINTMENTS    Check MY CHART for test results.  If you have forgotten your password, call [phone].  The diagnoses and medications listed in this after visit summary may not be up to date.  Check MY CHART in 28-48 hours for corrections.      Late cancellation policy: So that we can better accommodate people who are sick, please give our office 24 hour notice for an appointment cancellation.    Missed appointments: Your care is very important to us.  It is important that you keep your scheduled appointments.   Multiple missed appointments will lead to a dismissal from the office.    Patients arriving late will be worked into the schedule as time permits, with patients arriving on time taken as scheduled. Late arriving patients are more than welcome to wait or reschedule their appointments.    Please allow 5-7 business days for paperwork to be processed.

## 2024-05-29 LAB
CHOLEST SERPL-MCNC: 150 MG/DL (ref 0–199)
CREAT UR-MCNC: 104.6 MG/DL (ref 39–259)
HCV AB SERPL QL IA: NORMAL
HDLC SERPL-MCNC: 36 MG/DL (ref 40–60)
LDLC SERPL CALC-MCNC: 85 MG/DL
MICROALBUMIN UR DL<=1MG/L-MCNC: <1.2 MG/DL
MICROALBUMIN/CREAT UR: NORMAL MG/G (ref 0–30)
TRIGL SERPL-MCNC: 143 MG/DL (ref 0–150)
VLDLC SERPL CALC-MCNC: 29 MG/DL

## 2024-05-31 LAB
HIV 1+2 AB+HIV1 P24 AG SERPL QL IA: NORMAL
HIV 2 AB SERPL QL IA: NORMAL
HIV1 AB SERPL QL IA: NORMAL
HIV1 P24 AG SERPL QL IA: NORMAL

## 2024-11-19 ENCOUNTER — OFFICE VISIT (OUTPATIENT)
Dept: FAMILY MEDICINE CLINIC | Age: 49
End: 2024-11-19
Payer: COMMERCIAL

## 2024-11-19 VITALS
DIASTOLIC BLOOD PRESSURE: 92 MMHG | WEIGHT: 221.2 LBS | BODY MASS INDEX: 28.78 KG/M2 | OXYGEN SATURATION: 95 % | HEART RATE: 80 BPM | SYSTOLIC BLOOD PRESSURE: 134 MMHG

## 2024-11-19 DIAGNOSIS — E11.9 TYPE 2 DIABETES MELLITUS WITHOUT COMPLICATION, WITHOUT LONG-TERM CURRENT USE OF INSULIN (HCC): Primary | ICD-10-CM

## 2024-11-19 DIAGNOSIS — R03.0 ELEVATED BLOOD PRESSURE READING: ICD-10-CM

## 2024-11-19 DIAGNOSIS — Z78.9 PATIENT SPEAKS ONLY A FOREIGN LANGUAGE: ICD-10-CM

## 2024-11-19 DIAGNOSIS — S67.194A CRUSHING INJURY OF RIGHT RING FINGER, INITIAL ENCOUNTER: ICD-10-CM

## 2024-11-19 LAB — HBA1C MFR BLD: 6.3 %

## 2024-11-19 PROCEDURE — 99214 OFFICE O/P EST MOD 30 MIN: CPT | Performed by: FAMILY MEDICINE

## 2024-11-19 PROCEDURE — 3044F HG A1C LEVEL LT 7.0%: CPT | Performed by: FAMILY MEDICINE

## 2024-11-19 PROCEDURE — 83036 HEMOGLOBIN GLYCOSYLATED A1C: CPT | Performed by: FAMILY MEDICINE

## 2024-11-19 ASSESSMENT — ENCOUNTER SYMPTOMS: ABDOMINAL PAIN: 1

## 2024-11-19 NOTE — PROGRESS NOTES
Patient ID: Iker Herzog 1975    .  Chief Complaint   Patient presents with    Amaris Caldwell #[id number]    Diabetes    Gastroesophageal Reflux    Medication Problem     Patient has been out of his medication for months          Diabetes  Diabetes visit type: pharmacy closed and has been out of his medications for months. He has type 2 diabetes mellitus. When asked about current treatments, none were reported. His overall blood glucose range is 110-130 mg/dl.   Gastroesophageal Reflux  He complains of abdominal pain. This is a recurrent problem. The current episode started more than 1 year ago. The problem occurs frequently. He has tried a histamine-2 antagonist (pharmacy closed and has been out of his medication for months) for the symptoms. The treatment provided moderate relief.     Finger injury: last month heavy object dropped on his ring finger.  Swollen.  Taking ibuprofen    Patient Active Problem List   Diagnosis    Hepatic steatosis    Type 2 diabetes mellitus without complication, without long-term current use of insulin (HCC)    Patient speaks only a foreign language    Gastroesophageal reflux disease without esophagitis       No past surgical history on file.    Family History   Problem Relation Age of Onset    Osteoarthritis Mother        Current Outpatient Medications on File Prior to Visit   Medication Sig Dispense Refill    omeprazole (PRILOSEC) 40 MG delayed release capsule Take 1 capsule by mouth every morning (before breakfast) To replace Pepcid (Patient not taking: Reported on 11/19/2024) 90 capsule 3    atorvastatin (LIPITOR) 40 MG tablet Take 1 tablet by mouth daily (Patient not taking: Reported on 11/19/2024) 90 tablet 1    glipiZIDE (GLIPIZIDE XL) 10 MG extended release tablet Take 1 tablet by mouth daily (Patient not taking: Reported on 11/19/2024) 90 tablet 1    metFORMIN (GLUCOPHAGE-XR) 500 MG extended release tablet Take 2 tablets by mouth daily (with breakfast) (Patient not

## 2024-11-19 NOTE — PATIENT INSTRUCTIONS
BRING YOUR MEDICATION BOTTLES TO ALL APPOINTMENTS    Check MY CHART for test results.  If you have forgotten your password, call [phone].  The diagnoses and medications listed in this after visit summary may not be up to date.  Check MY CHART in 28-48 hours for corrections.      Late cancellation policy: So that we can better accommodate people who are sick, please give our office 24 hour notice for an appointment cancellation.    Missed appointments: Your care is very important to us.  It is important that you keep your scheduled appointments.   Multiple missed appointments will lead to a dismissal from the office.    Patients arriving late will be worked into the schedule as time permits, with patients arriving on time taken as scheduled. Late arriving patients are more than welcome to wait or reschedule their appointments.    Please allow 5-7 business days for paperwork to be processed.

## 2025-02-19 ENCOUNTER — OFFICE VISIT (OUTPATIENT)
Dept: FAMILY MEDICINE CLINIC | Age: 50
End: 2025-02-19

## 2025-02-19 VITALS
SYSTOLIC BLOOD PRESSURE: 118 MMHG | OXYGEN SATURATION: 98 % | WEIGHT: 221.8 LBS | BODY MASS INDEX: 28.47 KG/M2 | HEIGHT: 74 IN | DIASTOLIC BLOOD PRESSURE: 80 MMHG | HEART RATE: 86 BPM

## 2025-02-19 DIAGNOSIS — R20.2 PARESTHESIA OF FINGER: Primary | ICD-10-CM

## 2025-02-19 DIAGNOSIS — R73.03 PREDIABETES: ICD-10-CM

## 2025-02-19 DIAGNOSIS — M54.9 UPPER BACK PAIN ON RIGHT SIDE: ICD-10-CM

## 2025-02-19 DIAGNOSIS — Z78.9 PATIENT SPEAKS ONLY A FOREIGN LANGUAGE: ICD-10-CM

## 2025-02-19 PROBLEM — E11.9 TYPE 2 DIABETES MELLITUS WITHOUT COMPLICATION, WITHOUT LONG-TERM CURRENT USE OF INSULIN (HCC): Status: RESOLVED | Noted: 2024-02-27 | Resolved: 2025-02-19

## 2025-02-19 LAB — HBA1C MFR BLD: 6.4 %

## 2025-02-19 RX ORDER — NAPROXEN 500 MG/1
500 TABLET ORAL 2 TIMES DAILY WITH MEALS
Qty: 60 TABLET | Refills: 0 | Status: SHIPPED | OUTPATIENT
Start: 2025-02-19

## 2025-02-19 RX ORDER — METHYLPREDNISOLONE 4 MG/1
TABLET ORAL
Qty: 1 KIT | Refills: 0 | Status: SHIPPED | OUTPATIENT
Start: 2025-02-19 | End: 2025-02-25

## 2025-02-19 RX ORDER — METHOCARBAMOL 500 MG/1
500 TABLET, FILM COATED ORAL 3 TIMES DAILY PRN
Qty: 40 TABLET | Refills: 0 | Status: SHIPPED | OUTPATIENT
Start: 2025-02-19

## 2025-02-19 ASSESSMENT — PATIENT HEALTH QUESTIONNAIRE - PHQ9
1. LITTLE INTEREST OR PLEASURE IN DOING THINGS: SEVERAL DAYS
2. FEELING DOWN, DEPRESSED OR HOPELESS: SEVERAL DAYS
SUM OF ALL RESPONSES TO PHQ QUESTIONS 1-9: 2
SUM OF ALL RESPONSES TO PHQ QUESTIONS 1-9: 2
SUM OF ALL RESPONSES TO PHQ9 QUESTIONS 1 & 2: 2
SUM OF ALL RESPONSES TO PHQ QUESTIONS 1-9: 2
SUM OF ALL RESPONSES TO PHQ QUESTIONS 1-9: 2

## 2025-02-19 NOTE — PATIENT INSTRUCTIONS
Prédiabète : recommandations de soin  Prediabetes: Care Instructions  Mackenzie recommandations de soin     Le prédiabète est un avertissement que vous avez un risque de développer le diabète de type 2 : Tiffanie signifie que votre glycémie est plus élevée qu'anselmo ne le devrait. Les aliments que vous mangez se transforme en sucre, que le corps utilise comme carburant. Normalement, un organe appelé le pancréas fabrique l'insuline, lita permet au sucre dans le sang de pénétrer dans les cellules. Mais quand votre corps ne sait plus utiliser l'insuline correctement, le sucre ne migre pas vers les cellules. Il reste dans le sang. Tiffanie s'appelle la résistance à l'insuline. L'accumulation du sucre dans le sang entraîne le prédiabète.  La bonne nouvelle est que sanjuanita changements de mode de vie peuvent vous aider à retrouver une glycémie normale et à éviter ou à retarder le diabète.  Les soins de suivi sont essentiels pour votre jose l-être et votre sécurité. Veuillez vous rendre à tous les rendez-vous et appelez votre médecin si vous avez sanjuanita problèmes. Il est également judicieux de connaître tous mackenzie résultats d'examens et de conserver une liste sanjuanita médicaments que vous prenez.  Comment prendre soin de vous à la yi ?  Surveillez votre poids. Un poids normal permet à votre corps d'utiliser correctement l'insuline.  Limitez la quantité de calories, de sucreries et de mauvaises graisses que vous mangez. Demandez à votre médecin si vous devez voir un diététicien. Un diététicien professionnel vous aidera à élaborer sanjuanita menus lita  à votre mode de vie.  Faites au moins 30 minutes d'exercice la plupart sanjuanita jours de la semaine. Une activité physique régulière permet de contrôler la glycémie. Tiffanie permet également de conserver votre poids de forme. La marche est un bon choix. Vous pouvez essayer d'autres activités pebbles que la course, la natation, le vélo ou jouer au tennis ou à un sport collectif.  Ne fumez pas. Le tabagisme

## 2025-02-19 NOTE — PROGRESS NOTES
Patient ID: Iker Herzog 1975    .  Chief Complaint   Patient presents with    Diabetes    Shoulder Pain     Right shoulder pain, cramps, when out side in the cold it hurts, has been going on since janurary            History of Present Illness  The patient is a 49-year-old male who presents for evaluation of prediabetes and right shoulder pain. He is accompanied by an .    Prediabetes  - He has been managing his diet meticulously, adhering to a regimen of low sugar and carbohydrates.  - He was previously advised to discontinue his diabetes medication due to a significant decrease in his blood glucose levels.  - His hemoglobin A1c level was recorded as 6.3 in November 2024.    Right Shoulder Pain  - He reports experiencing pain in his right shoulder and upper back, which he describes as cramping in nature.  - This discomfort extends into his fingers.  - The onset of this pain was in January 2025.  - He has not sought any treatment for this discomfort.  - He reports no history of any injury to the neck or shoulder.  - He does not engage in repetitive work or heavy lifting at his workplace.  - He occasionally experiences weakness in his hands.  - The pain is exacerbated during sleep and is somewhat alleviated by the consumption of hot tea.  - He has attempted to manage the pain with Tylenol, but it has proven ineffective.  -pain radiates into the 2nd and 3rd fingers of the right hand    Heartburn or Acid Reflux  - He was seen here for heartburn or acid reflux, but he is not taking the stomach medicine anymore.  - He was having chest pain because he was having cramps.    Supplemental Information  He coughs a lot when it is cold and is concerned if that would transform into pneumonia.    MEDICATIONS  Discontinued: famotidine    Patient Active Problem List   Diagnosis    Hepatic steatosis    Patient speaks only a foreign language    Gastroesophageal reflux disease without esophagitis    Prediabetes

## 2025-02-20 ENCOUNTER — TELEPHONE (OUTPATIENT)
Dept: FAMILY MEDICINE CLINIC | Age: 50
End: 2025-02-20

## 2025-02-20 PROBLEM — R73.03 PREDIABETES: Status: ACTIVE | Noted: 2025-02-20

## 2025-02-20 NOTE — TELEPHONE ENCOUNTER
----- Message from Dr. Neda Leon MD sent at 2/19/2025  8:33 PM EST -----   needed: please let him know I was late sending his prescriptions to the pharmacy yesterday, so he should check with them today

## 2025-02-27 NOTE — TELEPHONE ENCOUNTER
I have called patient twice and have also called up to chanelle, chanelle hangs up, patient VM is not set up a letter has been sent

## 2025-06-25 ENCOUNTER — COMMUNITY OUTREACH (OUTPATIENT)
Dept: FAMILY MEDICINE CLINIC | Age: 50
End: 2025-06-25